# Patient Record
Sex: MALE | Race: WHITE
[De-identification: names, ages, dates, MRNs, and addresses within clinical notes are randomized per-mention and may not be internally consistent; named-entity substitution may affect disease eponyms.]

---
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
## 2023-02-12 ENCOUNTER — HOSPITAL ENCOUNTER (EMERGENCY)
Dept: HOSPITAL 33 - ED | Age: 43
Discharge: HOME | End: 2023-02-12
Payer: COMMERCIAL

## 2023-02-12 VITALS — OXYGEN SATURATION: 95 % | DIASTOLIC BLOOD PRESSURE: 118 MMHG | HEART RATE: 100 BPM | SYSTOLIC BLOOD PRESSURE: 168 MMHG

## 2023-02-12 DIAGNOSIS — Z72.0: ICD-10-CM

## 2023-02-12 DIAGNOSIS — S02.2XXA: ICD-10-CM

## 2023-02-12 DIAGNOSIS — Z28.310: ICD-10-CM

## 2023-02-12 DIAGNOSIS — M25.571: ICD-10-CM

## 2023-02-12 DIAGNOSIS — M25.471: Primary | ICD-10-CM
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
## 2023-02-12 PROCEDURE — 73610 X-RAY EXAM OF ANKLE: CPT

## 2023-02-12 PROCEDURE — 99291 CRITICAL CARE FIRST HOUR: CPT

## 2023-02-12 PROCEDURE — 70150 X-RAY EXAM OF FACIAL BONES: CPT

## 2023-02-12 PROCEDURE — 99283 EMERGENCY DEPT VISIT LOW MDM: CPT
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
## 2023-02-13 NOTE — XRAY
Indication: Pain following injury.



Comparison: None



3 view right ankle demonstrates soft tissue swelling laterally.  No other

bony, articular, or soft tissue abnormalities.

## 2023-02-13 NOTE — XRAY
Indication: Nasal pain following injury.



Comparison: None



4 view nasal bone negative for acute fracture or suspicious bony lesions.

Incidental minimal/mild C4-C7 degenerative changes.  Query remote fracture

superior endplate C4.  Paranasal sinuses are clear.



Impression: Chronic cervical findings.  Negative acute fracture.

## 2023-05-19 ENCOUNTER — HOSPITAL ENCOUNTER (EMERGENCY)
Dept: HOSPITAL 33 - ED | Age: 43
Discharge: HOME | End: 2023-05-19
Payer: COMMERCIAL

## 2023-05-19 VITALS — DIASTOLIC BLOOD PRESSURE: 70 MMHG | SYSTOLIC BLOOD PRESSURE: 107 MMHG | OXYGEN SATURATION: 96 % | HEART RATE: 43 BPM

## 2023-05-19 DIAGNOSIS — Z72.0: ICD-10-CM

## 2023-05-19 DIAGNOSIS — N48.30: Primary | ICD-10-CM

## 2023-05-19 DIAGNOSIS — Z28.310: ICD-10-CM

## 2023-05-19 PROCEDURE — 99282 EMERGENCY DEPT VISIT SF MDM: CPT

## 2023-05-19 NOTE — ERPHSYRPT
- History of Present Illness


Time Seen by Provider: 05/19/23 08:30


Source: patient


Exam Limitations: no limitations


Physician History: 





This is a 42-year-old white male who presents with priapism that began at 430 

this morning.  Patient states he does not take any medication whatsoever.  This 

is the patient's third episode in 2 years.  He has had injections in his base of

the penis on 2 other occasions.  He is never seen a urologist.  Patient denies 

chest pain.  He has no shortness of breath.  He actually states that he feels as

though his erection is letting up significantly at the time of this evaluation 

but he still wanted to be evaluated.


Timing/Duration: today


Activites at Onset: none


Quality: aching, throbbing


Onset Location: other (Penis)


Severity of Pain-Max: moderate


Severity of Pain-Current: mild


Modifying Factors: Improves With: nothing


Associated Symptoms: denies symptoms


Prior abdominal problems: none


Sexual intercourse history: non-contributory


Allergies/Adverse Reactions: 








No Known Drug Allergies Allergy (Verified 02/12/23 17:24)


   





Hx Influenza Vaccination/Date Given: No


Hx Pneumococcal Vaccination/Date Given: No





Travel Risk





- International Travel


Have you traveled outside of the country in past 3 weeks: No





- Coronavirus Screening


Are you exhibiting any of the following symptoms?: No


Close contact with a COVID-19 positive Pt in past 14-21 Days: No





- Vaccine Status


Have you recieved a Covid-19 vaccination: No





- Past Medical History


Pertinent Past Medical History: Yes


Neurological History: No Pertinent History


ENT History: No Pertinent History


Cardiac History: No Pertinent History


Respiratory History: No Pertinent History


Endocrine Medical History: No Pertinent History


Musculoskeletal History: No Pertinent History


GI Medical History: No Pertinent History


 History: No Pertinent History


Psycho-Social History: Depression


Male Reproductive Disorders: No Pertinent History





- Past Surgical History


Past Surgical History: Yes


Neuro Surgical History: No Pertinent History


Cardiac: No Pertinent History


Respiratory: No Pertinent History


Gastrointestinal: No Pertinent History


Genitourinary: No Pertinent History


Musculoskeletal: Orthopedic Surgery


Male Surgical History: No Pertinent History


Other Surgical History: Right wrist





- Social History


Smoking Status: Current every day smoker


How long have you smoked: years


Exposure to second hand smoke: Yes


Drug Use: none


Patient Lives Alone: No





- Review of Systems


Constitutional: No Symptoms


Eyes: No Symptoms


Ears, Nose, & Throat: No Symptoms


Respiratory: No Symptoms


Cardiac: No Symptoms


Abdominal/Gastrointestinal: No Symptoms


Genitourinary Symptoms: Other (Priapism)


Musculoskeletal: No Symptoms


Skin: No Symptoms


Neurological: No Symptoms


Psychological: No Symptoms


Endocrine: No Symptoms


Hematologic/Lymphatic: No Symptoms


Immunological/Allergic: No Symptoms


All Other Systems: Reviewed and Negative





- Nursing Vital Signs


Nursing Vital Signs: 


                               Initial Vital Signs











Temperature  97.8 F   05/19/23 08:34


 


Pulse Rate  68   05/19/23 08:34


 


Respiratory Rate  20   05/19/23 08:34


 


Blood Pressure  142/94   05/19/23 08:34


 


O2 Sat by Pulse Oximetry  94 L  05/19/23 08:34








                                   Pain Scale











Pain Intensity                 5

















- Physical Exam


General Appearance: no apparent distress, alert, anxiety


Eye Exam: PERRL/EOMI, eyes nml inspection


Ears, Nose, Throat Exam: normal ENT inspection, moist mucous membranes


Neck Exam: normal inspection, non-tender, supple, full range of motion


Respiratory Exam: No chest tenderness


Gastrointestinal/Abdomen Exam: No tenderness


Rectal Exam: not done (Patient's penis is nearly completely flaccid.  His 

erection has nearly completely resolved.  There is no redness there is no 

evidence of infection.  His pain has significantly improved)


Male Genital Exam: circumcised


Back Exam: normal inspection, normal range of motion, No CVA tenderness, No 

vertebral tenderness


Extremity Exam: normal inspection, normal range of motion, pelvis stable


Neurologic Exam: alert, oriented x 3, cooperative, CNs II-XII nml as tested, 

normal mood/affect, nml cerebellar function, nml station & gait, sensation nml


Skin Exam: normal color, warm, dry


Lymphatic Exam: No adenopathy


**SpO2 Interpretation**: normal


O2 Delivery: Room Air





- Course


Nursing assessment & vital signs reviewed: Yes





- Progress


Progress: improved, re-examined


Progress Note: 





05/19/23 08:51


Patient arrives with a nearly completely flaccid penis.  His erection has nearly

completely resolved.  He has very mild penile pain.  There is no evidence of 

infection.  This is the patient's third episode in 2 years.  He does not have a 

primary care provider and we will provide him with a list of primary care 

providers today.  We will also attempt to obtain an outpatient urology 

appointment for him to be seen within the next few days if possible.  Patient's 

level of medical complexity is low.  We placed ice to the base of the penis as 

well as the perineum.


Counseled pt/family regarding: diagnosis, need for follow-up





Medical Desision Making





- Diagnostic Testing


Diagnostic test were ordered, analyzed, and reviewed by me: No





- Risk of complications


Minimal Risk: Minimal risk of morbidity





- Departure


Departure Disposition: Home


Clinical Impression: 


 Priapism, unspecified





Condition: Stable


Critical Care Time: No


Referrals: 


DOCTOR,NO FAMILY [Primary Care Provider] - Follow up/PCP as directed


Additional Instructions: 


Ice pack to base of penis and perineum (the space between your scrotum and anus)

if unresolved erection recurs.  Follow-up with urologist at scheduled 

appointment.  Use the list provided from us to select a primary care provider 

and follow-up with them as well.

## 2023-07-09 ENCOUNTER — HOSPITAL ENCOUNTER (EMERGENCY)
Dept: HOSPITAL 33 - ED | Age: 43
Discharge: LEFT BEFORE DISCHARGE | End: 2023-07-09
Payer: COMMERCIAL

## 2023-07-09 DIAGNOSIS — Z53.21: Primary | ICD-10-CM

## 2023-07-09 PROCEDURE — 99281 EMR DPT VST MAYX REQ PHY/QHP: CPT
